# Patient Record
Sex: FEMALE
[De-identification: names, ages, dates, MRNs, and addresses within clinical notes are randomized per-mention and may not be internally consistent; named-entity substitution may affect disease eponyms.]

---

## 2019-03-13 ENCOUNTER — HOSPITAL ENCOUNTER (EMERGENCY)
Dept: HOSPITAL 14 - H.ER | Age: 39
LOS: 1 days | Discharge: HOME | End: 2019-03-14
Payer: COMMERCIAL

## 2019-03-13 DIAGNOSIS — R07.89: Primary | ICD-10-CM

## 2019-03-13 PROCEDURE — 99284 EMERGENCY DEPT VISIT MOD MDM: CPT

## 2019-03-13 PROCEDURE — 80048 BASIC METABOLIC PNL TOTAL CA: CPT

## 2019-03-13 PROCEDURE — 81025 URINE PREGNANCY TEST: CPT

## 2019-03-13 PROCEDURE — 96375 TX/PRO/DX INJ NEW DRUG ADDON: CPT

## 2019-03-13 PROCEDURE — 93005 ELECTROCARDIOGRAM TRACING: CPT

## 2019-03-13 PROCEDURE — 84484 ASSAY OF TROPONIN QUANT: CPT

## 2019-03-13 PROCEDURE — 85025 COMPLETE CBC W/AUTO DIFF WBC: CPT

## 2019-03-13 PROCEDURE — 71046 X-RAY EXAM CHEST 2 VIEWS: CPT

## 2019-03-13 PROCEDURE — 96374 THER/PROPH/DIAG INJ IV PUSH: CPT

## 2019-03-13 PROCEDURE — 87804 INFLUENZA ASSAY W/OPTIC: CPT

## 2019-03-13 NOTE — ED PDOC
HPI: Chest Pain


Time Seen by Provider: 03/13/19 22:37


Chief Complaint (Nursing): Chest Pain


Chief Complaint (Provider): Chest Pain


History Per: Patient


History/Exam Limitations: no limitations


Onset/Duration Of Symptoms: Days (1)


Associated Symptoms: denies: Nausea


Additional Complaint(s): 





39 y/o female presents to the ED with feeling of chest muscle spasms through to

ut the day. Patient states she had right arm pain yesterday that was atraumatic 

and went away on its own. Patient has a history of GERD and took Pepcid however 

that didn't help with her chest pain. Patient denies nausea, vomiting, shortness

of breath, or dizziness. 





Past Medical History


Reviewed: Historical Data, Nursing Documentation, Vital Signs


Vital Signs: 





                                Last Vital Signs











Temp  98.0 F   03/13/19 21:08


 


Pulse  92 H  03/13/19 21:08


 


Resp  16   03/13/19 21:08


 


BP  128/87   03/13/19 21:08


 


Pulse Ox  96   03/13/19 21:08














- Medical History


PMH: GERD





- Home Medications


Home Medications: 


                                Ambulatory Orders











 Medication  Instructions  Recorded


 


Nitrofurantoin Macrocrystals 100 mg PO BID #14 cap 04/03/15





[Macrobid]  














- Allergies


Allergies/Adverse Reactions: 


                                    Allergies











Allergy/AdvReac Type Severity Reaction Status Date / Time


 


shellfish derived Allergy  RASH Verified 03/13/19 21:08














Review of Systems


ROS Statement: Except As Marked, All Systems Reviewed And Found Negative


Cardiovascular: Positive for: Chest Pain


Respiratory: Negative for: Shortness of Breath


Gastrointestinal: Negative for: Nausea, Vomiting


Musculoskeletal: Positive for: Arm Pain (right)


Neurological: Negative for: Dizziness





Physical Exam





- Reviewed


Nursing Documentation Reviewed: Yes


Vital Signs Reviewed: Yes





- Physical Exam


Appears: Positive for: Well, Non-toxic, No Acute Distress


Head Exam: Positive for: ATRAUMATIC, NORMAL INSPECTION, NORMOCEPHALIC


Skin: Positive for: Normal Color, Warm, DRY


Eye Exam: Positive for: EOMI, Normal appearance, PERRL


ENT: Positive for: Normal ENT Inspection


Neck: Positive for: Normal, Painless ROM


Cardiovascular/Chest: Positive for: Regular Rate, Rhythm.  Negative for: Murmur


Respiratory: Positive for: Normal Breath Sounds.  Negative for: Respiratory 

Distress


Gastrointestinal/Abdominal: Positive for: Normal Exam, Soft.  Negative for: 

Tenderness


Back: Positive for: Normal Inspection


Extremity: Positive for: Normal ROM.  Negative for: Pedal Edema, Deformity


Neurological/Psych: Positive for: Awake, Alert, Normal Tone.  Negative for: 

Motor/Sensory Deficits





- Laboratory Results


Result Diagrams: 


                                 03/14/19 00:00





                                 03/13/19 23:55





- ECG


O2 Sat by Pulse Oximetry: 96 (RA)


Pulse Ox Interpretation: Normal





Medical Decision Making


Medical Decision Making: 





Time: 22:46


MDM: Workup for chest pain most likely musculoskeletal or gastritis.


* Labs with Troponin


* Toradol


* Protonix


* Pepcid


* Reassess





01:13


Patient reports pain/spasms have mildly improved. Cardiac was negative, CXR was 

unremarkable. Patient will be discharged home and advised to follow up with PMD 

and gastroenterologist. Return parameters discussed.











-----------------

--------------------------------------------------------------------------------





Scribe Attestation:


Documented by Jeet eDal, acting as a scribe for Gloria Weaver MD.





 


Provider Scribe Attestation:


All medical record entries made by the Scribe were at my direction and 

personally dictated by me. I have reviewed the chart and agree that the record 

accurately reflects my personal performance of the history, physical exam, 

medical decision making, and the department course for this patient. I have also

personally directed, reviewed, and agree with the discharge instructions and 

disposition.





Disposition





- Clinical Impression


Clinical Impression: 


 Atypical chest pain








- Disposition


Disposition: Routine/Home


Disposition Time: 01:02


Condition: STABLE


Additional Instructions: 


Follow up with primary medical doctor and gastroenterologist.  Return to the 

emergency department if symptoms worsen or if new symptoms develop.


Instructions:  Chest Pain That Is Not Caused by the Heart (DC), Chest Pain (DC),

Costochondritis (DC)


Forms:  [employer] (English)


Print Language: ENGLISH

## 2019-03-14 VITALS
TEMPERATURE: 97.3 F | HEART RATE: 81 BPM | OXYGEN SATURATION: 98 % | DIASTOLIC BLOOD PRESSURE: 64 MMHG | SYSTOLIC BLOOD PRESSURE: 117 MMHG

## 2019-03-14 VITALS — RESPIRATION RATE: 18 BRPM

## 2019-03-14 LAB
BASOPHILS # BLD AUTO: 0.1 K/UL (ref 0–0.2)
BASOPHILS NFR BLD: 0.9 % (ref 0–2)
BUN SERPL-MCNC: 14 MG/DL (ref 7–17)
CALCIUM SERPL-MCNC: 9.2 MG/DL (ref 8.4–10.2)
EOSINOPHIL # BLD AUTO: 0.6 K/UL (ref 0–0.7)
EOSINOPHIL NFR BLD: 4.3 % (ref 0–4)
ERYTHROCYTE [DISTWIDTH] IN BLOOD BY AUTOMATED COUNT: 13.1 % (ref 11.5–14.5)
GFR NON-AFRICAN AMERICAN: > 60
HGB BLD-MCNC: 12.8 G/DL (ref 12–16)
LYMPHOCYTES # BLD AUTO: 4.8 K/UL (ref 1–4.3)
LYMPHOCYTES NFR BLD AUTO: 35.2 % (ref 20–40)
MCH RBC QN AUTO: 28.1 PG (ref 27–31)
MCHC RBC AUTO-ENTMCNC: 33.5 G/DL (ref 33–37)
MCV RBC AUTO: 83.9 FL (ref 81–99)
MONOCYTES # BLD: 0.8 K/UL (ref 0–0.8)
MONOCYTES NFR BLD: 5.6 % (ref 0–10)
NEUTROPHILS # BLD: 7.3 K/UL (ref 1.8–7)
NEUTROPHILS NFR BLD AUTO: 54 % (ref 50–75)
NRBC BLD AUTO-RTO: 0 % (ref 0–0)
PLATELET # BLD: 334 K/UL (ref 130–400)
PMV BLD AUTO: 6.7 FL (ref 7.2–11.7)
RBC # BLD AUTO: 4.57 MIL/UL (ref 3.8–5.2)
WBC # BLD AUTO: 13.5 K/UL (ref 4.8–10.8)

## 2019-03-14 NOTE — RAD
Date of service: 



03/13/2019



HISTORY:

Cough.



COMPARISON:

No prior.



TECHNIQUE:

Chest PA and lateral



FINDINGS:



LUNGS:

No active pulmonary disease.



PLEURA:

No significant pleural effusion identified. No pneumothorax apparent.



CARDIOVASCULAR:

No aortic atherosclerotic calcification present.



Normal cardiac size. No pulmonary vascular congestion. 



OSSEOUS STRUCTURES:

No significant abnormalities.



VISUALIZED UPPER ABDOMEN:

Normal.



OTHER FINDINGS:

None.



IMPRESSION:

No active disease.

## 2019-03-14 NOTE — CARD
--------------- APPROVED REPORT --------------





Date of service: 03/13/2019



EKG Measurement

Heart [id number]

[id number]

[id number]

[id number]

[id number]



<Conclusion>

Normal sinus rhythm

Normal ECG